# Patient Record
Sex: MALE | Race: WHITE | NOT HISPANIC OR LATINO | Employment: STUDENT | URBAN - METROPOLITAN AREA
[De-identification: names, ages, dates, MRNs, and addresses within clinical notes are randomized per-mention and may not be internally consistent; named-entity substitution may affect disease eponyms.]

---
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
## 2018-01-13 VITALS
SYSTOLIC BLOOD PRESSURE: 116 MMHG | BODY MASS INDEX: 19.69 KG/M2 | HEART RATE: 80 BPM | WEIGHT: 122.5 LBS | HEIGHT: 66 IN | RESPIRATION RATE: 16 BRPM | DIASTOLIC BLOOD PRESSURE: 65 MMHG

## 2018-01-14 VITALS
BODY MASS INDEX: 19.67 KG/M2 | DIASTOLIC BLOOD PRESSURE: 76 MMHG | TEMPERATURE: 98.2 F | OXYGEN SATURATION: 98 % | WEIGHT: 122.38 LBS | HEIGHT: 66 IN | HEART RATE: 82 BPM | SYSTOLIC BLOOD PRESSURE: 102 MMHG

## 2018-01-15 VITALS
RESPIRATION RATE: 16 BRPM | HEART RATE: 68 BPM | WEIGHT: 126.75 LBS | SYSTOLIC BLOOD PRESSURE: 113 MMHG | BODY MASS INDEX: 20.37 KG/M2 | HEIGHT: 66 IN | DIASTOLIC BLOOD PRESSURE: 66 MMHG

## 2018-01-15 VITALS
HEART RATE: 81 BPM | TEMPERATURE: 98.3 F | SYSTOLIC BLOOD PRESSURE: 122 MMHG | HEIGHT: 66 IN | DIASTOLIC BLOOD PRESSURE: 64 MMHG | BODY MASS INDEX: 20.47 KG/M2 | WEIGHT: 127.38 LBS | OXYGEN SATURATION: 98 %

## 2018-01-15 VITALS
DIASTOLIC BLOOD PRESSURE: 60 MMHG | SYSTOLIC BLOOD PRESSURE: 112 MMHG | TEMPERATURE: 98 F | HEART RATE: 76 BPM | WEIGHT: 132 LBS | BODY MASS INDEX: 21.21 KG/M2 | HEIGHT: 66 IN | OXYGEN SATURATION: 96 %

## 2018-01-16 NOTE — MISCELLANEOUS
To Whom it May Concern,            Myrtle Reece RAFA 2000 is currently being treated at [facility] [address] [facility]  Due to his left foot brace, please excuse him from activities in gym that his brace would prevent him from participating in  Please contact our office  with any questions or concerns at [phone]  Sincerely,     AMY Barahona         Electronically signed by:Helio Cooley MD  Mar 29 2017  4:03PM EST Author

## 2018-04-02 ENCOUNTER — OFFICE VISIT (OUTPATIENT)
Dept: INTERNAL MEDICINE CLINIC | Age: 18
End: 2018-04-02
Payer: COMMERCIAL

## 2018-04-02 VITALS
HEIGHT: 65 IN | HEART RATE: 90 BPM | SYSTOLIC BLOOD PRESSURE: 104 MMHG | BODY MASS INDEX: 22.19 KG/M2 | TEMPERATURE: 96.6 F | WEIGHT: 133.2 LBS | OXYGEN SATURATION: 98 % | DIASTOLIC BLOOD PRESSURE: 62 MMHG

## 2018-04-02 DIAGNOSIS — E56.9 VITAMIN DEFICIENCY: ICD-10-CM

## 2018-04-02 DIAGNOSIS — G57.32 LEFT PERONEAL NERVE PALSY: Primary | ICD-10-CM

## 2018-04-02 DIAGNOSIS — G62.9 POLYNEUROPATHY: ICD-10-CM

## 2018-04-02 PROCEDURE — 3008F BODY MASS INDEX DOCD: CPT | Performed by: FAMILY MEDICINE

## 2018-04-02 PROCEDURE — 99213 OFFICE O/P EST LOW 20 MIN: CPT | Performed by: FAMILY MEDICINE

## 2018-04-02 RX ORDER — ACETAMINOPHEN 160 MG
1 TABLET,DISINTEGRATING ORAL DAILY
COMMUNITY

## 2018-04-02 NOTE — PROGRESS NOTES
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)   4  Left peroneal nerve palsy (355 3) (G57 32)     Plan  Vitamin D deficiency    · (1) VITAMIN D 25-HYDROXY;    · Follow-up visit in 6 Months Evaluation and Treatment  Follow-up  Status: Hold   Discussion/Summary  Discussion Summary:   Labs reviewed  D level:  26 from 41  And now 34  At this time, recommend OTC Vit D supplement 4000 units daily  And add extra pill once a week  note seen - continue to follow up as indicated  Pt did not go for possible genetic testing due to cost  Has AFO brace fitting this week for Lt ankle       f/up 6 months with labs  Continue care  Counseling Documentation With Imm: The patient was counseled regarding diagnostic results  Chief Complaint  Chief Complaint Free Text Note Form: 4 month F/U for Vitamin D deficiency, Polyneuropathy  work done 11/04/17 - did not pursue [employer] yet due to stable symptoms    Chief Complaint Chronic Condition [facility]: Patient is here today for follow up of chronic conditions described in HPI  History of Present Illness  Peripheral Neuropathy (Follow-Up): The patient is being seen for follow-up of peripheral neuropathy  The patient reports doing well  Interval Events: neuro note reviewed, EMG reviewed  11/14/17: did not get a second opinion from [facility]  has not followed up with Neuro  he will go as needed if anything changes    uses his brace and does his exercises  Disease management:  the patient is doing well with his goals  Vitamin D Deficiency: The patient is being seen for follow-up of vitamin D deficiency  Disease type: vitamin D deficiency  Recent laboratory results: date 11/2017, 25-hydroxyvitamin D 26 ng/mL  Review of Systems  [facility]:  Constitutional:, but-- no fever,-- no chills-- and-- no recent weight loss  Eyes: no eyesight problems    ENT: no hearing loss,-- no nosebleeds-- and-- no sore throat  Cardiovascular: no chest pain-- and-- no palpitations  Respiratory: no shortness of breath,-- no cough-- and-- no shortness of breath during exertion  Gastrointestinal: no abdominal pain,-- no constipation-- and-- no diarrhea  Musculoskeletal: no joint swelling-- and-- no limb pain  Neurological: as noted in HPI,-- no headache,-- no dizziness-- and-- no limb weakness  Vitals:    04/02/18 1721   BP: 104/62   Pulse: 90   Temp: (!) 96 6 °F (35 9 °C)   SpO2: 98%       Physical Exam   Constitutional - General appearance: No acute distress, well appearing and well nourished  Head and Face - Face and sinuses: Normal, no sinus tenderness  Eyes - Conjunctiva and lids: No injection, edema or discharge  -- Pupils and irises: Equal, round, reactive to light bilaterally  Ears, Nose, Mouth, and Throat - Oropharynx: Moist mucosa, normal tongue and tonsils without lesions  Neck - Neck: Supple, symmetric, no masses  Pulmonary - Respiratory effort: Normal respiratory rate and rhythm, no increased work of breathing -- Auscultation of lungs: Clear bilaterally  Cardiovascular - Auscultation of heart: Regular rate and rhythm, normal S1 and S2, no murmur  Abdomen - Abdomen: Normal bowel sounds, soft, non-tender, no masses  Musculoskeletal - Gait and station: Normal gait  -- Inspection/palpation of joints, bones, and muscles: Abnormal -- (-TTP b/l wrist, -swelling, -erythema  FROM of b/l wrist w/o restriction  Muscle strength symmetrical b/l UE  Noted some reproduction of symptoms with Tinel's sign but negative Phalen's sign )    Skin - Skin and subcutaneous tissue: Normal   Neurologic - Cranial nerves: Normal   Psychiatric - Orientation to person, place, and time: Normal -- Mood and affect: Normal

## 2018-09-06 ENCOUNTER — CLINICAL SUPPORT (OUTPATIENT)
Dept: INTERNAL MEDICINE CLINIC | Age: 18
End: 2018-09-06
Payer: COMMERCIAL

## 2018-09-06 DIAGNOSIS — Z23 NEED FOR DIPHTHERIA, TETANUS, ACELLULAR PERTUSSIS AND HAEMOPHILUS INFLUENZAE VACCINE: Primary | ICD-10-CM

## 2018-09-06 DIAGNOSIS — Z23 NEED FOR INFLUENZA VACCINATION: ICD-10-CM

## 2018-09-06 PROCEDURE — 90686 IIV4 VACC NO PRSV 0.5 ML IM: CPT

## 2018-09-06 PROCEDURE — 90471 IMMUNIZATION ADMIN: CPT

## 2018-10-16 ENCOUNTER — OFFICE VISIT (OUTPATIENT)
Dept: INTERNAL MEDICINE CLINIC | Age: 18
End: 2018-10-16
Payer: COMMERCIAL

## 2018-10-16 VITALS
TEMPERATURE: 98.2 F | OXYGEN SATURATION: 98 % | DIASTOLIC BLOOD PRESSURE: 58 MMHG | HEART RATE: 80 BPM | WEIGHT: 145.2 LBS | HEIGHT: 65 IN | BODY MASS INDEX: 24.19 KG/M2 | SYSTOLIC BLOOD PRESSURE: 100 MMHG

## 2018-10-16 DIAGNOSIS — G57.32 LEFT PERONEAL NERVE PALSY: ICD-10-CM

## 2018-10-16 DIAGNOSIS — G62.9 POLYNEUROPATHY: ICD-10-CM

## 2018-10-16 DIAGNOSIS — E55.9 VITAMIN D DEFICIENCY: Primary | ICD-10-CM

## 2018-10-16 PROCEDURE — 1036F TOBACCO NON-USER: CPT | Performed by: FAMILY MEDICINE

## 2018-10-16 PROCEDURE — 3008F BODY MASS INDEX DOCD: CPT | Performed by: FAMILY MEDICINE

## 2018-10-16 PROCEDURE — 99214 OFFICE O/P EST MOD 30 MIN: CPT | Performed by: FAMILY MEDICINE

## 2018-10-16 NOTE — PROGRESS NOTES
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)   4  Left peroneal nerve palsy (355 3) (G57 32)     Plan  Vitamin D deficiency    · (1) VITAMIN D 25-HYDROXY;    · Follow-up visit in 6 Months Evaluation and Treatment  Follow-up  Status: Hold   Discussion/Summary  Discussion Summary:   Labs reviewed  D level:  26 from 41, 34 now 51  At this time, recommend OTC Vit D supplement 4000 units daily  And add extra pill once a week  note seen - continue to follow up as indicated  Pt did not go for possible genetic testing due to cost  Has AFO brace fitting this week for Lt ankle       f/up 6 months with labs  Continue care  Counseling Documentation With Imm: The patient was counseled regarding diagnostic results  Chief Complaint  Chief Complaint Free Text Note Form: 4 month F/U for Vitamin D deficiency, Polyneuropathy  did not pursue [employer] yet due to stable symptoms    Chief Complaint Chronic Condition  [facility]: Patient is here today for follow up of chronic conditions described in HPI  History of Present Illness  Peripheral Neuropathy (Follow-Up): The patient is being seen for follow-up of peripheral neuropathy  The patient reports doing well  Interval Events: neuro note reviewed, EMG reviewed  11/14/17: did not get a second opinion from [facility]  has not followed up with Neuro  he will go as needed if anything changes    uses his brace and does his exercises  Disease management:  the patient is doing well with his goals  Vitamin D Deficiency: The patient is being seen for follow-up of vitamin D deficiency  Disease type: vitamin D deficiency  Recent laboratory results: date 11/2017, 25-hydroxyvitamin D 26 ng/mL  10/16/18: level is now 46 from 34     Review of Systems  [facility]:  Constitutional:, but-- no fever,-- no chills-- and-- no recent weight loss  Eyes: no eyesight problems    ENT: no hearing loss,-- no nosebleeds-- and-- no sore throat  Cardiovascular: no chest pain-- and-- no palpitations  Respiratory: no shortness of breath,-- no cough-- and-- no shortness of breath during exertion  Gastrointestinal: no abdominal pain,-- no constipation-- and-- no diarrhea  Musculoskeletal: no joint swelling-- and-- no limb pain  Neurological: as noted in HPI,-- no headache,-- no dizziness-- and-- no limb weakness  Vitals:    10/16/18 1652   BP: 100/58   Pulse: 80   Temp: 98 2 °F (36 8 °C)   SpO2: 98%       Physical Exam   Constitutional - General appearance: No acute distress, well appearing and well nourished  Head and Face - Face and sinuses: Normal, no sinus tenderness  Eyes - Conjunctiva and lids: No injection, edema or discharge  -- Pupils and irises: Equal, round, reactive to light bilaterally  Ears, Nose, Mouth, and Throat - Oropharynx: Moist mucosa, normal tongue and tonsils without lesions  Neck - Neck: Supple, symmetric, no masses  Pulmonary - Respiratory effort: Normal respiratory rate and rhythm, no increased work of breathing -- Auscultation of lungs: Clear bilaterally  Cardiovascular - Auscultation of heart: Regular rate and rhythm, normal S1 and S2, no murmur  Abdomen - Abdomen: Normal bowel sounds, soft, non-tender, no masses  Musculoskeletal - Gait and station: Normal gait with  LE braces   -- Inspection/palpation of joints, bones, and muscles: Abnormal -- FROM of b/l wrist w/o restriction  Muscle strength symmetrical b/l UE  Noted some reproduction of symptoms with Tinel's sign but negative Phalen's sign )    Skin - Skin and subcutaneous tissue: Normal   Neurologic - Cranial nerves: Normal   Psychiatric - Orientation to person, place, and time: Normal -- Mood and affect: Normal

## 2019-04-15 ENCOUNTER — OFFICE VISIT (OUTPATIENT)
Dept: INTERNAL MEDICINE CLINIC | Age: 19
End: 2019-04-15
Payer: COMMERCIAL

## 2019-04-15 VITALS
DIASTOLIC BLOOD PRESSURE: 58 MMHG | OXYGEN SATURATION: 98 % | WEIGHT: 150.8 LBS | HEART RATE: 76 BPM | TEMPERATURE: 96.7 F | BODY MASS INDEX: 24.94 KG/M2 | SYSTOLIC BLOOD PRESSURE: 96 MMHG

## 2019-04-15 DIAGNOSIS — E55.9 VITAMIN D DEFICIENCY: ICD-10-CM

## 2019-04-15 DIAGNOSIS — Z23 NEED FOR DTAP VACCINE: Primary | ICD-10-CM

## 2019-04-15 DIAGNOSIS — G57.32 LEFT PERONEAL NERVE PALSY: ICD-10-CM

## 2019-04-15 DIAGNOSIS — G62.9 POLYNEUROPATHY: ICD-10-CM

## 2019-04-15 PROCEDURE — 99213 OFFICE O/P EST LOW 20 MIN: CPT | Performed by: FAMILY MEDICINE

## 2019-10-18 ENCOUNTER — OFFICE VISIT (OUTPATIENT)
Dept: INTERNAL MEDICINE CLINIC | Age: 19
End: 2019-10-18
Payer: COMMERCIAL

## 2019-10-18 VITALS
WEIGHT: 157 LBS | HEART RATE: 79 BPM | BODY MASS INDEX: 25.23 KG/M2 | DIASTOLIC BLOOD PRESSURE: 60 MMHG | SYSTOLIC BLOOD PRESSURE: 98 MMHG | TEMPERATURE: 95.4 F | HEIGHT: 66 IN | OXYGEN SATURATION: 97 %

## 2019-10-18 DIAGNOSIS — G57.32 LEFT PERONEAL NERVE PALSY: ICD-10-CM

## 2019-10-18 DIAGNOSIS — E55.9 VITAMIN D DEFICIENCY: ICD-10-CM

## 2019-10-18 DIAGNOSIS — Z23 NEED FOR INFLUENZA VACCINATION: Primary | ICD-10-CM

## 2019-10-18 PROCEDURE — 99213 OFFICE O/P EST LOW 20 MIN: CPT | Performed by: NURSE PRACTITIONER

## 2019-10-18 PROCEDURE — 90686 IIV4 VACC NO PRSV 0.5 ML IM: CPT

## 2019-10-18 PROCEDURE — 3008F BODY MASS INDEX DOCD: CPT | Performed by: NURSE PRACTITIONER

## 2019-10-18 PROCEDURE — 90471 IMMUNIZATION ADMIN: CPT

## 2019-10-18 NOTE — PROGRESS NOTES
Assessment/Plan:    Problem List Items Addressed This Visit        Nervous and Auditory    Left peroneal nerve palsy     Continue with brace with activity             Other    Vitamin D deficiency     Continue Vit D 5000 IU daily with 10,000IU once a week  Plan to recheck in 1 year  Once spring starts, will change to Vit D3 5000 units daily           Other Visit Diagnoses     Need for influenza vaccination    -  Primary          BMI Counseling: Body mass index is 25 23 kg/m²  Discussed the patient's BMI with him  The BMI is above normal  Nutrition recommendations include reducing portion sizes, decreasing overall calorie intake, 3-5 servings of fruits/vegetables daily, reducing fast food intake and consuming healthier snacks  Exercise recommendations include moderate aerobic physical activity for 150 minutes/week  M*Modal software was used to dictate this note  It may contain errors with dictating incorrect words or incorrect spelling  Please contact the provider directly with any questions  Subjective:      Patient ID: Uriah Solorzano  is a 22 y o  male  HPI     Patient presents today accompanied by his ather for 6 month follow up  Vitamin D deficiency   He is currently on Vit D3 5000 units daily  His Vit D level was 43  They report that last year over the winter he was on 5000 U daily and once a week he would take 10,000U  They are asking if he should start that again  Left peroneal nerve palsy  He is no longer following with neurology  He was seeing physical therapy and was doing exercises  He continues to wear a brace on the left LE and wear supportive sneakers  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 [employer]       The following portions of the patient's history were reviewed and updated as appropriate: allergies, current medications, past family history, past medical history, past social history, past surgical history and problem list     Review of Systems   Constitutional: Negative for chills and fever  HENT: Positive for postnasal drip  Negative for congestion and rhinorrhea  Respiratory: Negative for cough, chest tightness, shortness of breath and wheezing  Cardiovascular: Negative for chest pain, palpitations and leg swelling  Past Medical History:   Diagnosis Date    Positive depression screening     last assessed: 3/6/2017         Current Outpatient Medications:     B Complex Vitamins (VITAMIN B COMPLEX PO), Take 1 tablet by mouth daily, Disp: , Rfl:     Cholecalciferol (VITAMIN D3) 2000 units capsule, Take 1 tablet by mouth daily, Disp: , Rfl:     Allergies   Allergen Reactions    Amoxicillin GI Intolerance       Social History   Past Surgical History:   Procedure Laterality Date    NO PAST SURGERIES       Family History   Problem Relation Age of Onset    Hyperlipidemia Father        Objective:  BP 98/60 (BP Location: Left arm, Patient Position: Sitting, Cuff Size: Standard)   Pulse 79   Temp (!) 95 4 °F (35 2 °C) (Tympanic)   Ht 5' 6 14" (1 68 m)   Wt 71 2 kg (157 lb)   SpO2 97%   BMI 25 23 kg/m²      Physical Exam   Constitutional: He is oriented to person, place, and time  He appears well-developed and well-nourished  No distress  HENT:   Head: Normocephalic and atraumatic  Right Ear: Tympanic membrane and external ear normal    Left Ear: Tympanic membrane and external ear normal    Nose: Nose normal    Mouth/Throat: Oropharynx is clear and moist  No oropharyngeal exudate, posterior oropharyngeal edema or posterior oropharyngeal erythema  Eyes: Pupils are equal, round, and reactive to light  Conjunctivae and EOM are normal    Neck: Normal range of motion  Neck supple  Cardiovascular: Normal rate, regular rhythm and normal heart sounds  No murmur heard  Pulmonary/Chest: Effort normal and breath sounds normal  No respiratory distress  He has no decreased breath sounds  He has no wheezes  He has no rhonchi  Musculoskeletal: He exhibits no edema     Brace on LLE   Lymphadenopathy:     He has no cervical adenopathy  Neurological: He is alert and oriented to person, place, and time  Skin: Skin is warm and dry  He is not diaphoretic  Psychiatric: He has a normal mood and affect  His behavior is normal    Vitals reviewed

## 2019-10-18 NOTE — ASSESSMENT & PLAN NOTE
Continue Vit D 5000 IU daily with 10,000IU once a week  Plan to recheck in 1 year  Once spring starts, will change to Vit D3 5000 units daily

## 2020-06-19 DIAGNOSIS — E55.9 VITAMIN D DEFICIENCY: Primary | ICD-10-CM

## 2020-07-13 ENCOUNTER — OFFICE VISIT (OUTPATIENT)
Dept: INTERNAL MEDICINE CLINIC | Age: 20
End: 2020-07-13
Payer: COMMERCIAL

## 2020-07-13 VITALS
DIASTOLIC BLOOD PRESSURE: 68 MMHG | WEIGHT: 155 LBS | BODY MASS INDEX: 24.91 KG/M2 | SYSTOLIC BLOOD PRESSURE: 114 MMHG | HEIGHT: 66 IN | TEMPERATURE: 99 F | OXYGEN SATURATION: 98 % | HEART RATE: 68 BPM

## 2020-07-13 DIAGNOSIS — Z00.00 ANNUAL PHYSICAL EXAM: Primary | ICD-10-CM

## 2020-07-13 PROCEDURE — 3008F BODY MASS INDEX DOCD: CPT | Performed by: INTERNAL MEDICINE

## 2020-07-13 PROCEDURE — 99395 PREV VISIT EST AGE 18-39: CPT | Performed by: INTERNAL MEDICINE

## 2020-07-13 NOTE — PATIENT INSTRUCTIONS

## 2020-07-13 NOTE — PROGRESS NOTES
Assessment/Plan:    Annual physical exam  - History and physical examination done  - Pt was counseled to eat a heart healthy diet, to drink at least 2 L of water daily, to take a daily multivitamin and to exercise for at least 30 minutes of cardio exercise daily, for at least 5 days a week  - CBC, CMP, TSH and lipid panel have been ordered and we will follow up with the results  - vaccinations are up-to-date including his tetanus vaccine, and meningococcal vaccine done at the age of 14  - follow up in 1 year for an annual physical or sooner as needed  - will complete his school physical form once the lab results are available  Diagnoses and all orders for this visit:    Annual physical exam  -     CBC and differential; Future  -     Comprehensive metabolic panel; Future  -     TSH, 3rd generation with Free T4 reflex; Future  -     Lipid panel; Future          Subjective:      Patient ID: Jona Strong  is a 23 y o  male      HPI  Patient presents for an annual physical exam   Last annual physical exam- cant remember    Past medical history- vit d def, left peroneal palsy, sprain of the left ankle, recurrent ear infections, seasonal allergies  Past surgical history- tympanic tubes as an infant    Medications- see list  Allergies-Amoxicillin - severe diarrhea and abd pain    Diet- a mixture of balanced diet and junk but more junk, drinks about 6-10 16 oz bottles  Exercise- has started doing push ups last week    Alcohol use- occasionally drinks 1-2 bottles of hard cider  Caffeine and soda use- one cup of coffee a day, no tea, occasionally soda  Nicotine use- never  Recreational drug use-no    Work- college student, 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 Type and ? Great grandparent    Today, patient admits to nasal congestion and rhinorrhea secondary to seasonal allergies and occasional left foot pain but denies fever, chills, night sweats, fever, headaches, weight gain, weight loss, earache, sore throat, cough, shortness of breath, palpitations, nausea, vomiting, abdominal pain, diarrhea, constipation, hematuria, hematochezia, myalgias  The following portions of the patient's history were reviewed and updated as appropriate:   He  has a past medical history of Positive depression screening  He   Patient Active Problem List    Diagnosis Date Noted    Annual physical exam 07/13/2020    Vitamin D deficiency 12/07/2016    Polyneuropathy 11/09/2016    Left peroneal nerve palsy 10/17/2016     He  has a past surgical history that includes No past surgeries  His family history includes Hyperlipidemia in his father  He  reports that he has never smoked  He has never used smokeless tobacco  He reports that he drinks alcohol  He reports that he does not use drugs  Current Outpatient Medications   Medication Sig Dispense Refill    B Complex Vitamins (VITAMIN B COMPLEX PO) Take 1 tablet by mouth daily      Cholecalciferol (VITAMIN D3) 2000 units capsule Take 1 tablet by mouth daily       No current facility-administered medications for this visit  Current Outpatient Medications on File Prior to Visit   Medication Sig    B Complex Vitamins (VITAMIN B COMPLEX PO) Take 1 tablet by mouth daily    Cholecalciferol (VITAMIN D3) 2000 units capsule Take 1 tablet by mouth daily     No current facility-administered medications on file prior to visit  He is allergic to amoxicillin       Review of Systems   Constitutional: Negative for activity change, chills, fatigue, fever and unexpected weight change  HENT: Positive for congestion and rhinorrhea (occasionally 2/2 allergies)  Negative for ear pain, postnasal drip, sinus pressure and sore throat  Eyes: Negative for pain  Respiratory: Negative for cough, choking, chest tightness, shortness of breath and wheezing  Cardiovascular: Negative for chest pain, palpitations and leg swelling  Gastrointestinal: Negative for abdominal pain, constipation, diarrhea, nausea and vomiting  Genitourinary: Negative for dysuria and hematuria  Musculoskeletal: Positive for arthralgias (occasional left foot pain when standing too long)  Negative for back pain, gait problem, joint swelling, myalgias and neck stiffness  Skin: Negative for pallor and rash  Neurological: Negative for dizziness, tremors, seizures, syncope, light-headedness and headaches  Hematological: Negative for adenopathy  Psychiatric/Behavioral: Negative for behavioral problems  Objective:      /68 (BP Location: Left arm, Patient Position: Sitting, Cuff Size: Standard)   Pulse 68   Temp 99 °F (37 2 °C) (Temporal)   Ht 5' 6 14" (1 68 m)   Wt 70 3 kg (155 lb)   SpO2 98%   BMI 24 91 kg/m²          Physical Exam   Constitutional: He is oriented to person, place, and time  He appears well-developed and well-nourished  No distress  HENT:   Head: Normocephalic and atraumatic  Right Ear: External ear normal    Left Ear: External ear normal    Nose: Mucosal edema ( nasal mucosa erythema and edema) present  Right sinus exhibits no maxillary sinus tenderness and no frontal sinus tenderness  Left sinus exhibits no maxillary sinus tenderness and no frontal sinus tenderness  Mouth/Throat: Oropharynx is clear and moist  Mucous membranes are dry (Dry mucous membranes)  No oropharyngeal exudate  Eyes: Pupils are equal, round, and reactive to light  EOM are normal  Right eye exhibits no discharge   Left eye exhibits no discharge  Right conjunctiva is injected  Left conjunctiva is injected  No scleral icterus  Neck: Normal range of motion  Neck supple  No JVD present  No tracheal deviation present  No thyromegaly present  Cardiovascular: Normal rate, regular rhythm, normal heart sounds and intact distal pulses  Exam reveals no gallop and no friction rub  No murmur heard  Pulmonary/Chest: Effort normal and breath sounds normal  No respiratory distress  He has no wheezes  He has no rales  He exhibits no tenderness  Abdominal: Soft  Bowel sounds are normal  He exhibits no distension and no mass  There is no tenderness  There is no rebound and no guarding  Musculoskeletal: Normal range of motion  He exhibits no edema, tenderness or deformity  Feet:    Lymphadenopathy:     He has no cervical adenopathy  Neurological: He is alert and oriented to person, place, and time  He has normal reflexes  No cranial nerve deficit  He exhibits normal muscle tone  Coordination normal    Cranial nerves 2-12 are intact bilaterally  Muscle strength is 5/5 in all extremities  Sensation is intact in bilateral face and extremities  Rapid alternating movement and finger-to-nose pointing test are intact that bilaterally  Deep tender reflexes are 2+ bilaterally  Gait is intact but patient is unable to heel walk on the left because of his left footdrop  Skin: Skin is warm and dry  No rash noted  He is not diaphoretic  No erythema  No pallor  Psychiatric: He has a normal mood and affect  His behavior is normal          No visits with results within 12 Month(s) from this visit  Latest known visit with results is:   No results found for any previous visit

## 2020-07-16 NOTE — PROGRESS NOTES
I called and discussed with patient's father and he stated that patient had his lab tests done at [email or web address] lab  We will call and try to obtain the results and then have patient have a dipstick urinalysis in the office so that his from can be completed in its entirety

## 2020-07-23 ENCOUNTER — TELEPHONE (OUTPATIENT)
Dept: INTERNAL MEDICINE CLINIC | Age: 20
End: 2020-07-23

## 2020-07-23 PROBLEM — E78.49 OTHER HYPERLIPIDEMIA: Status: ACTIVE | Noted: 2020-07-23

## 2020-07-23 NOTE — TELEPHONE ENCOUNTER
I called and left patient a message on his voicemail to call the office regarding his lab results  ADDENDUM AT [address] [place] [address] JULY 23RD, 2020  Patient and his father returned my call and I discussed the results of his lab test with him  I counseled patient to work on his diet and exercise and to eat a heart healthy diet and his father stated that he also has a history of hypertriglyceridemia and that they have started him on fish oil tablets already

## 2020-09-30 ENCOUNTER — CLINICAL SUPPORT (OUTPATIENT)
Dept: INTERNAL MEDICINE CLINIC | Age: 20
End: 2020-09-30
Payer: COMMERCIAL

## 2020-09-30 DIAGNOSIS — Z23 NEED FOR INFLUENZA VACCINATION: Primary | ICD-10-CM

## 2020-09-30 PROCEDURE — 90686 IIV4 VACC NO PRSV 0.5 ML IM: CPT

## 2020-09-30 PROCEDURE — 90471 IMMUNIZATION ADMIN: CPT

## 2020-12-14 ENCOUNTER — OFFICE VISIT (OUTPATIENT)
Dept: INTERNAL MEDICINE CLINIC | Age: 20
End: 2020-12-14
Payer: COMMERCIAL

## 2020-12-14 VITALS
SYSTOLIC BLOOD PRESSURE: 122 MMHG | BODY MASS INDEX: 25.43 KG/M2 | OXYGEN SATURATION: 96 % | WEIGHT: 158.2 LBS | DIASTOLIC BLOOD PRESSURE: 60 MMHG | HEART RATE: 78 BPM | TEMPERATURE: 97.7 F | HEIGHT: 66 IN

## 2020-12-14 DIAGNOSIS — E55.9 VITAMIN D DEFICIENCY: ICD-10-CM

## 2020-12-14 DIAGNOSIS — E78.49 OTHER HYPERLIPIDEMIA: Primary | ICD-10-CM

## 2020-12-14 DIAGNOSIS — G57.32 LEFT PERONEAL NERVE PALSY: ICD-10-CM

## 2020-12-14 PROCEDURE — 1036F TOBACCO NON-USER: CPT | Performed by: FAMILY MEDICINE

## 2020-12-14 PROCEDURE — 99214 OFFICE O/P EST MOD 30 MIN: CPT | Performed by: FAMILY MEDICINE

## 2020-12-14 PROCEDURE — 3008F BODY MASS INDEX DOCD: CPT | Performed by: FAMILY MEDICINE

## 2021-06-29 ENCOUNTER — OFFICE VISIT (OUTPATIENT)
Dept: INTERNAL MEDICINE CLINIC | Age: 21
End: 2021-06-29
Payer: COMMERCIAL

## 2021-06-29 VITALS
OXYGEN SATURATION: 98 % | SYSTOLIC BLOOD PRESSURE: 122 MMHG | HEART RATE: 84 BPM | HEIGHT: 67 IN | TEMPERATURE: 98.2 F | BODY MASS INDEX: 25.3 KG/M2 | WEIGHT: 161.2 LBS | DIASTOLIC BLOOD PRESSURE: 80 MMHG

## 2021-06-29 DIAGNOSIS — E78.49 OTHER HYPERLIPIDEMIA: Primary | ICD-10-CM

## 2021-06-29 DIAGNOSIS — Z11.59 ENCOUNTER FOR HEPATITIS C SCREENING TEST FOR LOW RISK PATIENT: ICD-10-CM

## 2021-06-29 DIAGNOSIS — E55.9 VITAMIN D DEFICIENCY: ICD-10-CM

## 2021-06-29 PROCEDURE — 99214 OFFICE O/P EST MOD 30 MIN: CPT | Performed by: FAMILY MEDICINE

## 2021-06-29 PROCEDURE — 3008F BODY MASS INDEX DOCD: CPT | Performed by: FAMILY MEDICINE

## 2021-06-29 PROCEDURE — 1036F TOBACCO NON-USER: CPT | Performed by: FAMILY MEDICINE

## 2021-06-29 PROCEDURE — 3725F SCREEN DEPRESSION PERFORMED: CPT | Performed by: FAMILY MEDICINE

## 2021-06-29 NOTE — PROGRESS NOTES
Assessment/Plan:    1  Other hyperlipidemia  -     Comprehensive metabolic panel; Future  -     Lipid panel; Future    2  Vitamin D deficiency  -     Vitamin D 25 hydroxy; Future    3  Encounter for hepatitis C screening test for low risk patient  -     Hepatitis C antibody; Future    Reviewed lab work in slight increase in glucose  Patient admits to eating more candy  Repeat lab work 6 months        There are no Patient Instructions on file for this visit  Return in about 6 months (around 12/29/2021) for Recheck  Subjective:      Patient ID: Jeff Mcbride  is a 19 y o  male  Chief Complaint   Patient presents with    Follow-up     review labs 6/26/21       HPI       Peripheral Neuropathy (Follow-Up): The patient is being seen for follow-up of peripheral neuropathy  The patient reports doing well   Interval Events: neuro note reviewed, EMG reviewed  11/14/17: did not get a second opinion from [facility]  has not followed up with Neuro  he will go as needed if anything changes    uses his brace and does his exercises    Disease management:  the patient is doing well with his goals  December 2020, has a brace on his left lower extremity a m  Was diagnosed as a peroneal neuropathy  Was told that may be genetic however genetics testing would be about 1000 dollars and he would like to hold off on that for now  He is doing well with no progression of symptoms  Saw Neurology in [facility] but was told did not need to follow-up  June 2021, stable, uses left lower extremity paresis       Vitamin D Deficiency: The patient is being seen for follow-up of vitamin D deficiency  Disease type: vitamin D deficiency  Recent laboratory results: date 11/2017, 25-hydroxyvitamin D 26 ng/mL   10/16/18: level is now 51 from 34   4/2019 level is now 57  December 2020, taking 5000 International Units over-the-counter every day and 10,000 on Sunday    Most recent lab work is now 46 which is working well for him        Hypertriglyceridemia  Last labs for triglycerides checked in in June  Levels showed 220  He has started to increase activity levels more and started fish oil 2000 mg per day   June 2021, increased fish oil and is using the micro tablets  Significant improvement to lipid levels and triglycerides and they are now in normal limits      The following portions of the patient's history were reviewed and updated as appropriate: allergies, current medications, past family history, past medical history, past social history, past surgical history and problem list     Review of Systems        Constitutional:  Denies fever or chills   Eyes:  Denies double or blurry vision  HENT:  Denies nasal congestion or sore throat   Respiratory:  Denies cough or shortness of breath or wheezing  Cardiovascular:  Denies palpitations or chest pain  GI:  Denies abdominal pain, nausea, or vomiting, no loose stools, no reflux  Integument:  Denies rash , no open areas  Neurologic:  Denies headache or focal weakness, no dizziness  : no dysuria      Current Outpatient Medications   Medication Sig Dispense Refill    B Complex Vitamins (VITAMIN B COMPLEX PO) Take 1 tablet by mouth daily      Cholecalciferol (VITAMIN D3) 2000 units capsule Take 1 tablet by mouth daily       No current facility-administered medications for this visit         Objective:    /80 (BP Location: Left arm, Patient Position: Sitting, Cuff Size: Adult)   Pulse 84   Temp 98 2 °F (36 8 °C) (Temporal)   Ht 5' 6 54" (1 69 m)   Wt 73 1 kg (161 lb 3 2 oz)   SpO2 98%   BMI 25 60 kg/m²        Physical Exam       Constitutional:  Well developed, well nourished, no acute distress, non-toxic appearance   Eyes:  PERRL, conjunctiva normal , non icteric sclera  HENT:  Atraumatic, oropharynx moist  Neck-  supple   Respiratory:  CTA b/l, normal breath sounds, no rales, no wheezing   Cardiovascular:  RRR, no murmurs, no LE edema b/l  GI:  Soft, nondistended, normal bowel sounds x 4, nontender, no organomegaly, no mass, no rebound, no guarding   Neurologic:  no focal deficits noted   Psychiatric:  Speech and behavior appropriate , AAO x 3        Gaynel Snellen, DO

## 2021-10-04 ENCOUNTER — CLINICAL SUPPORT (OUTPATIENT)
Dept: INTERNAL MEDICINE CLINIC | Age: 21
End: 2021-10-04
Payer: COMMERCIAL

## 2021-10-04 DIAGNOSIS — Z23 NEED FOR INFLUENZA VACCINATION: Primary | ICD-10-CM

## 2021-10-04 PROCEDURE — 90686 IIV4 VACC NO PRSV 0.5 ML IM: CPT

## 2021-10-04 PROCEDURE — 90471 IMMUNIZATION ADMIN: CPT

## 2021-12-21 ENCOUNTER — OFFICE VISIT (OUTPATIENT)
Dept: INTERNAL MEDICINE CLINIC | Age: 21
End: 2021-12-21
Payer: COMMERCIAL

## 2021-12-21 VITALS
HEIGHT: 67 IN | TEMPERATURE: 98 F | OXYGEN SATURATION: 96 % | DIASTOLIC BLOOD PRESSURE: 72 MMHG | BODY MASS INDEX: 25.58 KG/M2 | SYSTOLIC BLOOD PRESSURE: 112 MMHG | WEIGHT: 163 LBS | HEART RATE: 70 BPM

## 2021-12-21 DIAGNOSIS — E78.1 HYPERTRIGLYCERIDEMIA: ICD-10-CM

## 2021-12-21 DIAGNOSIS — E55.9 VITAMIN D DEFICIENCY: Primary | ICD-10-CM

## 2021-12-21 PROBLEM — E78.49 OTHER HYPERLIPIDEMIA: Status: RESOLVED | Noted: 2020-07-23 | Resolved: 2021-12-21

## 2021-12-21 PROCEDURE — 99214 OFFICE O/P EST MOD 30 MIN: CPT | Performed by: FAMILY MEDICINE

## 2021-12-21 NOTE — PROGRESS NOTES
Assessment/Plan:    1  Vitamin D deficiency    2  Hypertriglyceridemia  -     Lipid panel; Future  -     Comprehensive metabolic panel; Future      BMI Counseling: Body mass index is 25 88 kg/m²  The BMI is above normal  Nutrition recommendations include moderation in carbohydrate intake  Exercise recommendations include exercising 3-5 times per week  No pharmacotherapy was ordered  Rationale for BMI follow-up plan is due to patient being overweight or obese  There are no Patient Instructions on file for this visit  Return in about 6 months (around 6/21/2022) for Recheck  Subjective:      Patient ID: Johan Merrill  is a 19 y o  male  Chief Complaint   Patient presents with    Follow-up     review labs 12/17/21    BMI follow up       HPI       Vitamin D Deficiency: The patient is being seen for follow-up of vitamin D deficiency  Disease type: vitamin D deficiency  Recent laboratory results: date 11/2017, 25-hydroxyvitamin D 26 ng/mL   10/16/18: level is now 51 from 34   4/2019 level is now 57  December 2020, taking 5000 International Units over-the-counter every day and 10,000 on Sunday   Most recent lab work is now 46 which is working well for him        Peripheral Neuropathy (Follow-Up): The patient is being seen for follow-up of peripheral neuropathy  The patient reports doing well   Interval Events: neuro note reviewed, EMG reviewed  11/14/17: did not get a second opinion from [facility]  has not followed up with Neuro  he will go as needed if anything changes    uses his brace and does his exercises    Disease management:  the patient is doing well with his goals   December 2020, has a brace on his left lower extremity a m   Was diagnosed as a peroneal neuropathy   Was told that may be genetic however genetics testing would be about 1000 dollars and he would like to hold off on that for now  [employer] is doing well with no progression of symptoms   Saw Neurology in [facility] but was told did not need to follow-up  June 2021, stable, uses left lower extremity paresis   December 2021, no recent issues  Stable     Hypertriglyceridemia   Last labs for triglycerides checked in in June Shirlene January showed [address] has started to increase activity levels more and started fish oil 2000 mg per day   June 2021, increased fish oil and is using the micro tablets  Significant improvement to lipid levels and triglycerides and they are now in normal limits   December 2021, doing well          The following portions of the patient's history were reviewed and updated as appropriate: allergies, current medications, past family history, past medical history, past social history, past surgical history and problem list     Review of Systems        Constitutional:  Denies fever or chills   Eyes:  Denies double or blurry vision  HENT:  Denies nasal congestion or sore throat   Respiratory:  Denies cough or shortness of breath or wheezing  Cardiovascular:  Denies palpitations or chest pain  GI:  Denies abdominal pain, nausea, or vomiting, no loose stools, no reflux  Integument:  Denies rash , no open areas  Neurologic:  Denies headache or focal weakness, no dizziness  : no dysuria      Current Outpatient Medications   Medication Sig Dispense Refill    B Complex Vitamins (VITAMIN B COMPLEX PO) Take 1 tablet by mouth daily      Cholecalciferol (VITAMIN D3) 2000 units capsule Take 1 tablet by mouth daily      Multiple Vitamin (MULTIVITAMIN PO) Take by mouth 2 daily      Omega-3 Fatty Acids (FISH OIL PO) Take by mouth 2 daily       No current facility-administered medications for this visit         Objective:    /72 (BP Location: Left arm, Patient Position: Sitting, Cuff Size: Adult)   Pulse 70   Temp 98 °F (36 7 °C) (Temporal)   Ht 5' 6 54" (1 69 m)   Wt 73 9 kg (163 lb)   SpO2 96%   BMI 25 88 kg/m²        Physical Exam         Constitutional:  Well developed, well nourished, no acute distress, non-toxic appearance Eyes:  PERRL, conjunctiva normal , non icteric sclera  HENT:  Atraumatic, oropharynx moist  Neck-  supple   Respiratory:  CTA b/l, normal breath sounds, no rales, no wheezing   Cardiovascular:  RRR, no murmurs, no LE edema b/l  GI:  Soft, nondistended, normal bowel sounds x 4, nontender, no organomegaly, no mass, no rebound, no guarding   Neurologic:  no focal deficits noted   Psychiatric:  Speech and behavior appropriate , AAO x 3        Deatra Plume, DO

## 2022-06-29 ENCOUNTER — OFFICE VISIT (OUTPATIENT)
Dept: INTERNAL MEDICINE CLINIC | Age: 22
End: 2022-06-29
Payer: COMMERCIAL

## 2022-06-29 VITALS
WEIGHT: 167.8 LBS | SYSTOLIC BLOOD PRESSURE: 110 MMHG | HEIGHT: 67 IN | TEMPERATURE: 98.2 F | DIASTOLIC BLOOD PRESSURE: 80 MMHG | BODY MASS INDEX: 26.34 KG/M2 | HEART RATE: 86 BPM | OXYGEN SATURATION: 97 %

## 2022-06-29 DIAGNOSIS — E78.1 HYPERTRIGLYCERIDEMIA: ICD-10-CM

## 2022-06-29 DIAGNOSIS — E55.9 VITAMIN D DEFICIENCY: Primary | ICD-10-CM

## 2022-06-29 DIAGNOSIS — G62.9 POLYNEUROPATHY: ICD-10-CM

## 2022-06-29 DIAGNOSIS — Z23 NEED FOR TDAP VACCINATION: ICD-10-CM

## 2022-06-29 PROCEDURE — 90715 TDAP VACCINE 7 YRS/> IM: CPT | Performed by: FAMILY MEDICINE

## 2022-06-29 PROCEDURE — 99214 OFFICE O/P EST MOD 30 MIN: CPT | Performed by: FAMILY MEDICINE

## 2022-06-29 PROCEDURE — 90471 IMMUNIZATION ADMIN: CPT | Performed by: FAMILY MEDICINE

## 2022-06-29 NOTE — PROGRESS NOTES
Assessment/Plan:    1  Vitamin D deficiency  -     Vitamin D 25 hydroxy; Future    2  Hypertriglyceridemia  -     Lipid Panel with Direct LDL reflex; Future  -     Comprehensive metabolic panel; Future  -     CBC and differential; Future    3  Polyneuropathy  -     Vitamin D 25 hydroxy; Future    4  Need for Tdap vaccination  -     TDAP VACCINE GREATER THAN OR EQUAL TO 6YO IM      BMI Counseling: Body mass index is 26 65 kg/m²  The BMI is above normal  Nutrition recommendations include moderation in carbohydrate intake  Exercise recommendations include exercising 3-5 times per week  No pharmacotherapy was ordered  Rationale for BMI follow-up plan is due to patient being overweight or obese  Depression Screening and Follow-up Plan: Patient was screened for depression during today's encounter  They screened negative with a PHQ-2 score of 0  Discussed lowering carbs  Discussed lab work in detail with him in his father  All questions and concerns addressed to the best of my ability  There are no Patient Instructions on file for this visit  Return in about 1 year (around 6/29/2023) for Recheck  Subjective:      Patient ID: Chani Scherer  is a 23 y o  male  Chief Complaint   Patient presents with    Follow-up     Vitamin D deficiency  Review labs        HPI    Vitamin D Deficiency: The patient is being seen for follow-up of vitamin D deficiency  Disease type: vitamin D deficiency  Recent laboratory results: date 11/2017, 25-hydroxyvitamin D 26 ng/mL   10/16/18: level is now 51 from 34   4/2019 level is now 57  December 2020, taking 5000 International Units over-the-counter every day and 10,000 on Sunday   Most recent lab work is now 46 which is working well for him   [place] 2022, due for lab work     Peripheral Neuropathy (Follow-Up): The patient is being seen for follow-up of peripheral neuropathy  The patient reports doing well   Interval Events: neuro note reviewed, EMG reviewed   11/14/17: did not get a second opinion from [facility]  [facility]  has not followed up with Neuro  he will go as needed if anything changes    uses his brace and does his exercises    Disease management:  the patient is doing well with his goals   December 2020, has a brace on his left lower extremity a m  Was diagnosed as a peroneal neuropathy   Was told that may be genetic however genetics testing would be about 1000 dollars and he would like to hold off on that for now  Timbo Villareal is doing well with no progression of symptoms   Saw Neurology in [facility] but was told did not need to follow-up  June 2021, stable, uses left lower extremity paresis   December 2021, no recent issues  Stable   June 2022, stable    Hypertriglyceridemia   Last labs for triglycerides checked in in June  [facility] showed [address] has started to increase activity levels more and started fish oil 2000 mg per day   June 2021, increased fish oil and is using the micro tablets   Significant improvement to lipid levels and triglycerides and they are now in normal limits   December 2021, doing well   Arv[address] Fung [address], well controlled  Taking fish oil micro tablets that are working well    Triglycerides are 122       The following portions of the patient's history were reviewed and updated as appropriate: allergies, current medications, past family history, past medical history, past social history, past surgical history and problem list     Review of Systems      Constitutional:  Denies fever or chills   Eyes:  Denies double , blurry vision or eye pain  HENT:  Denies nasal congestion or sore throat   Respiratory:  Denies cough or shortness of breath or wheezing  Cardiovascular:  Denies palpitations or chest pain  GI:  Denies abdominal pain, nausea, or vomiting, no loose stools, no reflux  Integument:  Denies rash , no open areas  Neurologic:  Denies headache or focal weakness, no dizziness  : no dysuria, or hematuria      Current Outpatient Medications   Medication Sig Dispense Refill    B Complex Vitamins (VITAMIN B COMPLEX PO) Take 1 tablet by mouth daily      Cholecalciferol (VITAMIN D3) 2000 units capsule Take 1 tablet by mouth daily      Multiple Vitamin (MULTIVITAMIN PO) Take by mouth 2 daily      Omega-3 Fatty Acids (FISH OIL PO) Take by mouth 2 daily       No current facility-administered medications for this visit         Objective:    /80 (BP Location: Left arm, Patient Position: Sitting, Cuff Size: Standard)   Pulse 86   Temp 98 2 °F (36 8 °C) (Temporal)   Ht 5' 6 54" (1 69 m)   Wt 76 1 kg (167 lb 12 8 oz)   SpO2 97%   BMI 26 65 kg/m²        Physical Exam    Constitutional:  Well developed, well nourished, no acute distress, non-toxic appearance   Eyes:  PERRL, conjunctiva normal , non icteric sclera  HENT:  Atraumatic, oropharynx moist  Neck-  supple   Respiratory:  CTA b/l, normal breath sounds, no rales, no wheezing   Cardiovascular:  RRR, no murmurs, no LE edema b/l  GI:  Soft, nondistended, normal bowel sounds x 4, nontender, no organomegaly, no mass, no rebound, no guarding   Neurologic:  no focal deficits noted   Psychiatric:  Speech and behavior appropriate , AAO x 3  Musculoskeletal, left foot in brace           Doreene Cleaves, DO

## 2022-10-28 ENCOUNTER — IMMUNIZATIONS (OUTPATIENT)
Dept: INTERNAL MEDICINE CLINIC | Age: 22
End: 2022-10-28

## 2022-10-28 DIAGNOSIS — Z23 NEED FOR INFLUENZA VACCINATION: Primary | ICD-10-CM

## 2023-07-31 ENCOUNTER — OFFICE VISIT (OUTPATIENT)
Dept: INTERNAL MEDICINE CLINIC | Age: 23
End: 2023-07-31
Payer: COMMERCIAL

## 2023-07-31 VITALS
SYSTOLIC BLOOD PRESSURE: 112 MMHG | BODY MASS INDEX: 27 KG/M2 | TEMPERATURE: 97 F | WEIGHT: 168 LBS | HEART RATE: 90 BPM | HEIGHT: 66 IN | DIASTOLIC BLOOD PRESSURE: 70 MMHG | OXYGEN SATURATION: 96 %

## 2023-07-31 DIAGNOSIS — E55.9 VITAMIN D DEFICIENCY: ICD-10-CM

## 2023-07-31 DIAGNOSIS — E78.1 HYPERTRIGLYCERIDEMIA: ICD-10-CM

## 2023-07-31 DIAGNOSIS — Z00.00 ANNUAL PHYSICAL EXAM: Primary | ICD-10-CM

## 2023-07-31 DIAGNOSIS — G57.32 LEFT PERONEAL NERVE PALSY: ICD-10-CM

## 2023-07-31 PROCEDURE — 99395 PREV VISIT EST AGE 18-39: CPT | Performed by: FAMILY MEDICINE

## 2023-07-31 NOTE — PROGRESS NOTES
Assessment/Plan:    1. Annual physical exam    2. Hypertriglyceridemia  -     Lipid Panel with Direct LDL reflex; Future  -     Comprehensive metabolic panel; Future  -     CBC and differential; Future    3. Vitamin D deficiency  -     Vitamin D 25 hydroxy; Future    4. Left peroneal nerve palsy      BMI Counseling: Body mass index is 27.12 kg/m². The BMI is above normal. Nutrition recommendations include moderation in carbohydrate intake. Exercise recommendations include exercising 3-5 times per week. No pharmacotherapy was ordered. Rationale for BMI follow-up plan is due to patient being overweight or obese. Depression Screening and Follow-up Plan: Patient was screened for depression during today's encounter. They screened negative with a PHQ-2 score of 0. There are no Patient Instructions on file for this visit. Return for Annual physical.    Subjective:      Patient ID: Jerry Marr. is a 20 y.o. male. 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 He has slight paresthesias in his index finger and thumb. No weakness and has not dropped anything. Adult Annual Physical   Patient here for a comprehensive physical exam. The patient reports no problems. Diet and Physical Activity  · Diet/Nutrition: well balanced diet. Does eat out twice a week   · Exercise: no formal exercise. Depression Screening  PHQ-9 Depression Screening    PHQ-9:    Frequency of the following problems over the past two weeks:            General Health  · Sleep: sleeps well. · Hearing: normal - bilateral.  · Vision: no vision problems. · Dental: regular dental visits.        /GYN Health  · Recommend testicular exams       The following portions of the patient's history were reviewed and updated as appropriate: allergies, current medications, past family history, past medical history, past social history, past surgical history and problem list.    Review of Systems      Constitutional:  Denies fever or chills   Eyes:  Denies double , blurry vision or eye pain  HENT:  Denies nasal congestion, sore throat or new hearing issues, + allergy symptoms  Respiratory:  Denies cough or shortness of breath or wheezing  Cardiovascular:  Denies palpitations or chest pain  GI:  Denies abdominal pain, nausea, or vomiting, no loose stools, no reflux  Integument:  Denies rash , no open areas  Neurologic:  Denies headache or focal weakness, no dizziness  : no dysuria, or hematuria        Current Outpatient Medications   Medication Sig Dispense Refill   • B Complex Vitamins (VITAMIN B COMPLEX PO) Take 1 tablet by mouth daily     • Cholecalciferol (Vitamin D3) 125 MCG (5000 UT) CAPS Take 1 tablet by mouth daily     • Multiple Vitamin (MULTIVITAMIN PO) Take by mouth 2 daily     • Omega-3 Fatty Acids (FISH OIL PO) Take by mouth 2 daily       No current facility-administered medications for this visit.        Objective:    /70 (BP Location: Left arm, Patient Position: Sitting, Cuff Size: Large)   Pulse 90   Temp (!) 97 °F (36.1 °C) (Temporal)   Ht 5' 6" (1.676 m)   Wt 76.2 kg (168 lb)   SpO2 96% Comment: room air  BMI 27.12 kg/m²        Physical Exam      Constitutional:  Well developed, well nourished, no acute distress, non-toxic appearance   Eyes:  PERRL, conjunctiva normal , non icteric sclera  HENT:  Atraumatic, oropharynx moist. Neck-  supple   Respiratory:  CTA b/l, normal breath sounds, no rales, no wheezing   Cardiovascular:  RRR, no murmurs, no LE edema b/l  GI:  Soft, nondistended, normal bowel sounds x 4, nontender, no organomegaly, no mass, no rebound, no guarding   Neurologic:  no focal deficits noted   Psychiatric:  Speech and behavior appropriate , AAO x 3  MS; L foot brace in place       Ileana Nickerson, DO

## 2023-10-06 ENCOUNTER — CLINICAL SUPPORT (OUTPATIENT)
Dept: INTERNAL MEDICINE CLINIC | Age: 23
End: 2023-10-06
Payer: COMMERCIAL

## 2023-10-06 DIAGNOSIS — Z23 NEEDS FLU SHOT: Primary | ICD-10-CM

## 2023-10-06 PROCEDURE — 90471 IMMUNIZATION ADMIN: CPT

## 2023-10-06 PROCEDURE — 90686 IIV4 VACC NO PRSV 0.5 ML IM: CPT

## 2023-10-10 DIAGNOSIS — E55.9 VITAMIN D DEFICIENCY: Primary | ICD-10-CM

## 2023-10-10 DIAGNOSIS — R73.09 ABNORMAL GLUCOSE: ICD-10-CM

## 2023-10-10 DIAGNOSIS — R79.89 ABNORMAL TSH: ICD-10-CM

## 2023-10-10 DIAGNOSIS — E78.1 HYPERTRIGLYCERIDEMIA: ICD-10-CM

## 2024-06-19 ENCOUNTER — PATIENT MESSAGE (OUTPATIENT)
Dept: INTERNAL MEDICINE CLINIC | Age: 24
End: 2024-06-19

## 2024-08-01 ENCOUNTER — OFFICE VISIT (OUTPATIENT)
Dept: INTERNAL MEDICINE CLINIC | Age: 24
End: 2024-08-01
Payer: COMMERCIAL

## 2024-08-01 VITALS
DIASTOLIC BLOOD PRESSURE: 86 MMHG | HEART RATE: 80 BPM | OXYGEN SATURATION: 97 % | TEMPERATURE: 98.7 F | BODY MASS INDEX: 29.76 KG/M2 | SYSTOLIC BLOOD PRESSURE: 122 MMHG | WEIGHT: 178.6 LBS | HEIGHT: 65 IN

## 2024-08-01 DIAGNOSIS — E78.1 HYPERTRIGLYCERIDEMIA: ICD-10-CM

## 2024-08-01 DIAGNOSIS — E55.9 VITAMIN D DEFICIENCY: ICD-10-CM

## 2024-08-01 DIAGNOSIS — Z00.00 ANNUAL PHYSICAL EXAM: Primary | ICD-10-CM

## 2024-08-01 PROCEDURE — 99395 PREV VISIT EST AGE 18-39: CPT | Performed by: FAMILY MEDICINE

## 2024-08-01 NOTE — PROGRESS NOTES
Assessment/Plan:    1. Annual physical exam  -     Comprehensive metabolic panel; Future  -     CBC and differential; Future  -     Comprehensive metabolic panel  -     CBC and differential  2. Vitamin D deficiency  -     Vitamin D 25 hydroxy; Future  -     Vitamin D 25 hydroxy  3. Hypertriglyceridemia  -     Lipid Panel with Direct LDL reflex; Future  -     Comprehensive metabolic panel; Future  -     Lipid Panel with Direct LDL reflex  -     Comprehensive metabolic panel          There are no Patient Instructions on file for this visit.    Return for Annual physical.    Subjective:      Patient ID: Lee Quiroz Jr. is a 24 y.o. male.    Chief Complaint   Patient presents with    Physical Exam     Annual physical exam       HPI      Adult Annual Physical   Patient here for a comprehensive physical exam. The patient reports no problems.    Diet and Physical Activity  Diet/Nutrition:  lactose intolerance, needs more veges, too many carbs .   Exercise: no formal exercise.      Depression Screening  PHQ-9 Depression Screening    PHQ-9:    Frequency of the following problems over the past two weeks:            General Health  Sleep: sleeps well.   Hearing: normal - bilateral.  Vision: no vision problems wears glasses, yearly exams.   Dental: regular dental visits.       /GYN Health  Self testicle exams discussed    The following portions of the patient's history were reviewed and updated as appropriate: allergies, current medications, past family history, past medical history, past social history, past surgical history and problem list.    Review of Systems      Constitutional:  Denies fever or chills   Eyes:  Denies double , blurry vision or eye pain  HENT:  Denies nasal congestion, sore throat or new hearing issues  Respiratory:  Denies cough or shortness of breath or wheezing  Cardiovascular:  Denies palpitations or chest pain  GI:  Denies abdominal pain, nausea, or vomiting, no loose stools, no  "reflux  Integument:  Denies rash , no open areas  Neurologic:  Denies headache or focal weakness, no dizziness  : no dysuria, or hematuria        Current Outpatient Medications   Medication Sig Dispense Refill    B Complex Vitamins (VITAMIN B COMPLEX PO) Take 1 tablet by mouth daily      Cholecalciferol (Vitamin D3) 125 MCG (5000 UT) CAPS Take 1 tablet by mouth daily      Multiple Vitamin (MULTIVITAMIN PO) Take by mouth 2 daily      Omega-3 Fatty Acids (FISH OIL PO) Take by mouth 2 daily       No current facility-administered medications for this visit.       Objective:    /86 (BP Location: Left arm, Patient Position: Sitting, Cuff Size: Large)   Pulse 80   Temp 98.7 °F (37.1 °C) (Temporal)   Ht 5' 5.47\" (1.663 m)   Wt 81 kg (178 lb 9.6 oz)   SpO2 97%   BMI 29.29 kg/m²        Physical Exam      Constitutional:  Well developed, well nourished, no acute distress, non-toxic appearance   Eyes:  PERRL, conjunctiva normal , non icteric sclera  HENT:  Atraumatic, oropharynx moist. Neck-  supple   Respiratory:  CTA b/l, normal breath sounds, no rales, no wheezing   Cardiovascular:  RRR, no murmurs, no LE edema b/l  GI:  Soft, nondistended, normal bowel sounds x 4, nontender, no organomegaly, no mass, no rebound, no guarding   Neurologic:  no focal deficits noted   Psychiatric:  Speech and behavior appropriate , AAO x 3      Trevin Hargrove DO  "

## 2024-08-03 LAB
25(OH)D3+25(OH)D2 SERPL-MCNC: 76.5 NG/ML (ref 30–100)
ALBUMIN SERPL-MCNC: 4.9 G/DL (ref 4.3–5.2)
ALP SERPL-CCNC: 75 IU/L (ref 44–121)
ALT SERPL-CCNC: 134 IU/L (ref 0–44)
AST SERPL-CCNC: 79 IU/L (ref 0–40)
BASOPHILS # BLD AUTO: 0 X10E3/UL (ref 0–0.2)
BASOPHILS NFR BLD AUTO: 1 %
BILIRUB SERPL-MCNC: 0.8 MG/DL (ref 0–1.2)
BUN SERPL-MCNC: 14 MG/DL (ref 6–20)
BUN/CREAT SERPL: 14 (ref 9–20)
CALCIUM SERPL-MCNC: 10.1 MG/DL (ref 8.7–10.2)
CHLORIDE SERPL-SCNC: 102 MMOL/L (ref 96–106)
CHOLEST SERPL-MCNC: 220 MG/DL (ref 100–199)
CO2 SERPL-SCNC: 24 MMOL/L (ref 20–29)
CREAT SERPL-MCNC: 0.99 MG/DL (ref 0.76–1.27)
EGFR: 108 ML/MIN/1.73
EOSINOPHIL # BLD AUTO: 0.2 X10E3/UL (ref 0–0.4)
EOSINOPHIL NFR BLD AUTO: 3 %
ERYTHROCYTE [DISTWIDTH] IN BLOOD BY AUTOMATED COUNT: 12.2 % (ref 11.6–15.4)
GLOBULIN SER-MCNC: 2.2 G/DL (ref 1.5–4.5)
GLUCOSE SERPL-MCNC: 99 MG/DL (ref 70–99)
HCT VFR BLD AUTO: 46.9 % (ref 37.5–51)
HDLC SERPL-MCNC: 40 MG/DL
HGB BLD-MCNC: 16 G/DL (ref 13–17.7)
IMM GRANULOCYTES # BLD: 0 X10E3/UL (ref 0–0.1)
IMM GRANULOCYTES NFR BLD: 0 %
LDLC SERPL CALC-MCNC: 139 MG/DL (ref 0–99)
LYMPHOCYTES # BLD AUTO: 2.2 X10E3/UL (ref 0.7–3.1)
LYMPHOCYTES NFR BLD AUTO: 38 %
MCH RBC QN AUTO: 31.8 PG (ref 26.6–33)
MCHC RBC AUTO-ENTMCNC: 34.1 G/DL (ref 31.5–35.7)
MCV RBC AUTO: 93 FL (ref 79–97)
MONOCYTES # BLD AUTO: 0.5 X10E3/UL (ref 0.1–0.9)
MONOCYTES NFR BLD AUTO: 8 %
NEUTROPHILS # BLD AUTO: 2.9 X10E3/UL (ref 1.4–7)
NEUTROPHILS NFR BLD AUTO: 50 %
PLATELET # BLD AUTO: 223 X10E3/UL (ref 150–450)
POTASSIUM SERPL-SCNC: 4.5 MMOL/L (ref 3.5–5.2)
PROT SERPL-MCNC: 7.1 G/DL (ref 6–8.5)
RBC # BLD AUTO: 5.03 X10E6/UL (ref 4.14–5.8)
SODIUM SERPL-SCNC: 140 MMOL/L (ref 134–144)
TRIGL SERPL-MCNC: 227 MG/DL (ref 0–149)
WBC # BLD AUTO: 5.9 X10E3/UL (ref 3.4–10.8)

## 2024-08-06 DIAGNOSIS — E78.1 HYPERTRIGLYCERIDEMIA: Primary | ICD-10-CM

## 2024-10-04 ENCOUNTER — IMMUNIZATIONS (OUTPATIENT)
Dept: INTERNAL MEDICINE CLINIC | Age: 24
End: 2024-10-04
Payer: COMMERCIAL

## 2024-10-04 DIAGNOSIS — Z23 NEEDS FLU SHOT: Primary | ICD-10-CM

## 2024-10-04 PROCEDURE — 90656 IIV3 VACC NO PRSV 0.5 ML IM: CPT

## 2024-10-04 PROCEDURE — 90471 IMMUNIZATION ADMIN: CPT

## 2024-10-23 ENCOUNTER — CLINICAL SUPPORT (OUTPATIENT)
Dept: INTERNAL MEDICINE CLINIC | Age: 24
End: 2024-10-23
Payer: COMMERCIAL

## 2024-10-23 DIAGNOSIS — Z23 NEED FOR COVID-19 VACCINE: Primary | ICD-10-CM

## 2024-10-23 PROCEDURE — 90480 ADMN SARSCOV2 VAC 1/ONLY CMP: CPT

## 2024-10-23 PROCEDURE — 91320 SARSCV2 VAC 30MCG TRS-SUC IM: CPT
